# Patient Record
Sex: FEMALE | Race: WHITE | Employment: OTHER
[De-identification: names, ages, dates, MRNs, and addresses within clinical notes are randomized per-mention and may not be internally consistent; named-entity substitution may affect disease eponyms.]

---

## 2017-03-06 PROBLEM — I25.10 CORONARY ARTERY DISEASE INVOLVING NATIVE HEART WITHOUT ANGINA PECTORIS: Status: ACTIVE | Noted: 2017-03-06

## 2017-11-14 ENCOUNTER — HOSPITAL ENCOUNTER (OUTPATIENT)
Dept: GENERAL RADIOLOGY | Facility: CLINIC | Age: 67
Discharge: HOME | End: 2017-11-14
Payer: MEDICARE

## 2017-11-14 DIAGNOSIS — R06.2 WHEEZING: ICD-10-CM

## 2017-11-14 PROCEDURE — 71020 XR CHEST STANDARD TWO VW: CPT

## 2018-05-08 PROBLEM — G47.33 OSA (OBSTRUCTIVE SLEEP APNEA): Status: ACTIVE | Noted: 2018-05-08

## 2019-05-14 PROBLEM — N90.4 LICHEN SCLEROSUS OF FEMALE GENITALIA: Status: ACTIVE | Noted: 2019-05-14

## 2020-04-28 PROBLEM — E11.9 TYPE 2 DIABETES MELLITUS WITHOUT COMPLICATION, WITHOUT LONG-TERM CURRENT USE OF INSULIN (HCC): Status: ACTIVE | Noted: 2020-04-28

## 2020-05-05 RX ORDER — ROSUVASTATIN CALCIUM 5 MG/1
TABLET, COATED ORAL
Qty: 90 TABLET | OUTPATIENT
Start: 2020-05-05

## 2020-07-27 PROBLEM — E89.0 POSTABLATIVE HYPOTHYROIDISM: Status: ACTIVE | Noted: 2020-07-27

## 2020-08-21 ENCOUNTER — HOSPITAL ENCOUNTER (OUTPATIENT)
Age: 70
Setting detail: SPECIMEN
Discharge: HOME | End: 2020-08-21
Payer: MEDICARE

## 2020-08-21 LAB
ALT SERPL-CCNC: 25 U/L (ref 5–33)
ANION GAP SERPL CALCULATED.3IONS-SCNC: 17 MMOL/L (ref 9–17)
AST SERPL-CCNC: 23 U/L
BUN BLDV-MCNC: 10 MG/DL (ref 8–23)
BUN/CREAT BLD: ABNORMAL (ref 9–20)
CALCIUM SERPL-MCNC: 9.8 MG/DL (ref 8.6–10.4)
CHLORIDE BLD-SCNC: 103 MMOL/L (ref 98–107)
CHOLESTEROL, FASTING: 102 MG/DL
CHOLESTEROL/HDL RATIO: 3.4
CO2: 21 MMOL/L (ref 20–31)
CREAT SERPL-MCNC: 0.67 MG/DL (ref 0.5–0.9)
GFR AFRICAN AMERICAN: >60 ML/MIN
GFR NON-AFRICAN AMERICAN: >60 ML/MIN
GFR SERPL CREATININE-BSD FRML MDRD: ABNORMAL ML/MIN/{1.73_M2}
GFR SERPL CREATININE-BSD FRML MDRD: ABNORMAL ML/MIN/{1.73_M2}
GLUCOSE BLD-MCNC: 126 MG/DL (ref 70–99)
HCT VFR BLD CALC: 46.6 % (ref 36.3–47.1)
HDLC SERPL-MCNC: 30 MG/DL
HEMOGLOBIN: 14.6 G/DL (ref 11.9–15.1)
LDL CHOLESTEROL: 25 MG/DL (ref 0–130)
MCH RBC QN AUTO: 27.9 PG (ref 25.2–33.5)
MCHC RBC AUTO-ENTMCNC: 31.3 G/DL (ref 28.4–34.8)
MCV RBC AUTO: 88.9 FL (ref 82.6–102.9)
NRBC AUTOMATED: 0 PER 100 WBC
PDW BLD-RTO: 13 % (ref 11.8–14.4)
PLATELET # BLD: 283 K/UL (ref 138–453)
PMV BLD AUTO: 10.8 FL (ref 8.1–13.5)
POTASSIUM SERPL-SCNC: 4.3 MMOL/L (ref 3.7–5.3)
RBC # BLD: 5.24 M/UL (ref 3.95–5.11)
SODIUM BLD-SCNC: 141 MMOL/L (ref 135–144)
TRIGLYCERIDE, FASTING: 233 MG/DL
VLDLC SERPL CALC-MCNC: ABNORMAL MG/DL (ref 1–30)
WBC # BLD: 7.9 K/UL (ref 3.5–11.3)

## 2020-09-08 ENCOUNTER — HOSPITAL ENCOUNTER (OUTPATIENT)
Dept: CARDIAC CATH/INVASIVE PROCEDURES | Age: 70
Discharge: HOME | End: 2020-09-08
Attending: INTERNAL MEDICINE | Admitting: INTERNAL MEDICINE
Payer: MEDICARE

## 2020-09-08 VITALS
RESPIRATION RATE: 21 BRPM | OXYGEN SATURATION: 86 % | HEART RATE: 65 BPM | DIASTOLIC BLOOD PRESSURE: 106 MMHG | TEMPERATURE: 97 F | WEIGHT: 267 LBS | BODY MASS INDEX: 41.91 KG/M2 | HEIGHT: 67 IN | SYSTOLIC BLOOD PRESSURE: 154 MMHG

## 2020-09-08 PROBLEM — Z95.820 STATUS POST ANGIOPLASTY WITH STENT: Status: ACTIVE | Noted: 2020-09-08

## 2020-09-08 LAB
GFR NON-AFRICAN AMERICAN: >60 ML/MIN
GFR SERPL CREATININE-BSD FRML MDRD: >60 ML/MIN
GFR SERPL CREATININE-BSD FRML MDRD: NORMAL ML/MIN/{1.73_M2}
GLUCOSE BLD-MCNC: 164 MG/DL (ref 74–100)
PLATELET # BLD: 256 K/UL (ref 138–453)
POC CREATININE: 0.71 MG/DL (ref 0.51–1.19)
POC HEMATOCRIT: 45 % (ref 36–46)
POC HEMOGLOBIN: 15.3 G/DL (ref 12–16)
POC IONIZED CALCIUM: 1.22 MMOL/L (ref 1.15–1.33)
POC POTASSIUM: 3.9 MMOL/L (ref 3.5–4.5)
POC SODIUM: 141 MMOL/L (ref 138–146)

## 2020-09-08 PROCEDURE — 82330 ASSAY OF CALCIUM: CPT

## 2020-09-08 PROCEDURE — 82947 ASSAY GLUCOSE BLOOD QUANT: CPT

## 2020-09-08 PROCEDURE — 82565 ASSAY OF CREATININE: CPT

## 2020-09-08 PROCEDURE — C1725 CATH, TRANSLUMIN NON-LASER: HCPCS

## 2020-09-08 PROCEDURE — 2500000003 HC RX 250 WO HCPCS

## 2020-09-08 PROCEDURE — C1760 CLOSURE DEV, VASC: HCPCS

## 2020-09-08 PROCEDURE — C1769 GUIDE WIRE: HCPCS

## 2020-09-08 PROCEDURE — 2709999900 HC NON-CHARGEABLE SUPPLY

## 2020-09-08 PROCEDURE — 85049 AUTOMATED PLATELET COUNT: CPT

## 2020-09-08 PROCEDURE — 84132 ASSAY OF SERUM POTASSIUM: CPT

## 2020-09-08 PROCEDURE — 85014 HEMATOCRIT: CPT

## 2020-09-08 PROCEDURE — C1887 CATHETER, GUIDING: HCPCS

## 2020-09-08 PROCEDURE — 6370000000 HC RX 637 (ALT 250 FOR IP)

## 2020-09-08 PROCEDURE — C9600 PERC DRUG-EL COR STENT SING: HCPCS | Performed by: INTERNAL MEDICINE

## 2020-09-08 PROCEDURE — C1874 STENT, COATED/COV W/DEL SYS: HCPCS

## 2020-09-08 PROCEDURE — 7100000001 HC PACU RECOVERY - ADDTL 15 MIN

## 2020-09-08 PROCEDURE — 84295 ASSAY OF SERUM SODIUM: CPT

## 2020-09-08 PROCEDURE — 6360000004 HC RX CONTRAST MEDICATION

## 2020-09-08 PROCEDURE — C1894 INTRO/SHEATH, NON-LASER: HCPCS

## 2020-09-08 PROCEDURE — 7100000000 HC PACU RECOVERY - FIRST 15 MIN

## 2020-09-08 PROCEDURE — 6360000002 HC RX W HCPCS

## 2020-09-08 PROCEDURE — 93458 L HRT ARTERY/VENTRICLE ANGIO: CPT | Performed by: INTERNAL MEDICINE

## 2020-09-08 RX ORDER — SODIUM CHLORIDE 0.9 % (FLUSH) 0.9 %
10 SYRINGE (ML) INJECTION PRN
Status: DISCONTINUED | OUTPATIENT
Start: 2020-09-08 | End: 2020-09-09 | Stop reason: HOSPADM

## 2020-09-08 RX ORDER — ASPIRIN 81 MG/1
81 TABLET ORAL DAILY
Status: DISCONTINUED | OUTPATIENT
Start: 2020-09-09 | End: 2020-09-09 | Stop reason: HOSPADM

## 2020-09-08 RX ORDER — SODIUM CHLORIDE 0.9 % (FLUSH) 0.9 %
10 SYRINGE (ML) INJECTION EVERY 12 HOURS SCHEDULED
Status: DISCONTINUED | OUTPATIENT
Start: 2020-09-08 | End: 2020-09-09 | Stop reason: HOSPADM

## 2020-09-08 RX ORDER — LEVOTHYROXINE SODIUM 175 UG/1
175 TABLET ORAL DAILY
Status: DISCONTINUED | OUTPATIENT
Start: 2020-09-09 | End: 2020-09-09 | Stop reason: HOSPADM

## 2020-09-08 RX ORDER — LOSARTAN POTASSIUM 50 MG/1
100 TABLET ORAL DAILY
Status: DISCONTINUED | OUTPATIENT
Start: 2020-09-09 | End: 2020-09-09 | Stop reason: HOSPADM

## 2020-09-08 RX ORDER — SODIUM CHLORIDE 0.9 % (FLUSH) 0.9 %
10 SYRINGE (ML) INJECTION PRN
Status: CANCELLED | OUTPATIENT
Start: 2020-09-08

## 2020-09-08 RX ORDER — CLOPIDOGREL BISULFATE 75 MG/1
75 TABLET ORAL DAILY
Status: DISCONTINUED | OUTPATIENT
Start: 2020-09-08 | End: 2020-09-09 | Stop reason: HOSPADM

## 2020-09-08 RX ORDER — ROSUVASTATIN CALCIUM 10 MG/1
10 TABLET, COATED ORAL NIGHTLY
Status: DISCONTINUED | OUTPATIENT
Start: 2020-09-08 | End: 2020-09-09 | Stop reason: HOSPADM

## 2020-09-08 RX ORDER — ONDANSETRON 2 MG/ML
4 INJECTION INTRAMUSCULAR; INTRAVENOUS EVERY 6 HOURS PRN
Status: DISCONTINUED | OUTPATIENT
Start: 2020-09-08 | End: 2020-09-09 | Stop reason: HOSPADM

## 2020-09-08 RX ORDER — SODIUM CHLORIDE 0.9 % (FLUSH) 0.9 %
10 SYRINGE (ML) INJECTION EVERY 12 HOURS SCHEDULED
Status: CANCELLED | OUTPATIENT
Start: 2020-09-08

## 2020-09-08 RX ORDER — LABETALOL HYDROCHLORIDE 5 MG/ML
10 INJECTION, SOLUTION INTRAVENOUS EVERY 30 MIN PRN
Status: DISCONTINUED | OUTPATIENT
Start: 2020-09-08 | End: 2020-09-09 | Stop reason: HOSPADM

## 2020-09-08 RX ORDER — SODIUM CHLORIDE 9 MG/ML
INJECTION, SOLUTION INTRAVENOUS CONTINUOUS
Status: DISCONTINUED | OUTPATIENT
Start: 2020-09-08 | End: 2020-09-09 | Stop reason: HOSPADM

## 2020-09-08 RX ORDER — ACETAMINOPHEN 325 MG/1
650 TABLET ORAL EVERY 4 HOURS PRN
Status: DISCONTINUED | OUTPATIENT
Start: 2020-09-08 | End: 2020-09-09 | Stop reason: HOSPADM

## 2020-09-08 RX ORDER — ACETAMINOPHEN 325 MG/1
650 TABLET ORAL EVERY 4 HOURS PRN
Status: CANCELLED | OUTPATIENT
Start: 2020-09-08

## 2020-09-08 RX ADMIN — SODIUM CHLORIDE: 9 INJECTION, SOLUTION INTRAVENOUS at 11:40

## 2020-09-08 NOTE — PROGRESS NOTES
Returned to room 2 on [facility] awake alert no c/o of pain offered, rt. Groin bandaid in tact clean et dry, are soft to touch no bleeding noted, taking fluids well, vitals stable.

## 2020-09-08 NOTE — PROGRESS NOTES
Received post procedure to [facility] to room 2. Assessment obtained. Restrictions reviewed with patient. Post procedure pathway initiated. Right femoral site soft , band aid dry and intact. No hematoma noted. Family at side. Patient without complaints. Head of bed flat with right leg straight.

## 2020-09-08 NOTE — H&P
[facility]  Procedure History and Physical Update          Patient Name: Donovan Randolph  MRN:    [id number]  YOB: 1950  Date of evaluation:  2020    Procedure:    Cardiac cath +/- PCI    Indication for procedure:  Angina      Please refer to the office note completed by Dr. Dora Grey on 2020 in the medical record and note that:    [x] I have examined the patient and reviewed the H&P/Consult and there are no changes to be made to the assessment or plan. [] I have examined the patient and reviewed the H&P/Consult and have noted the following changes:    Past Medical History:   Diagnosis Date    Allergic rhinitis     Anxiety 2015    Arthritis 10/22/2014    Class 3 obesity due to excess calories with serious comorbidity and body mass index (BMI) of 40.0 to 44.9 in adult 2018    Coronary artery disease involving native heart without angina pectoris 3/6/2017    Grave's disease     Hypertension     IFG (impaired fasting glucose)     Lichen sclerosus of female genitalia 2019    DANICA (obstructive sleep apnea) 2018    Osteoarthritis     S/P cardiac cath 2020       Past Surgical History:   Procedure Laterality Date     SECTION  1981    repair 11 month later     CHOLECYSTECTOMY         Family History   Problem Relation Age of Onset    Cancer Mother         colon, lung, brain    High Blood Pressure Father     Other Father         claudication of legs       Allergies   Allergen Reactions    Amoxicillin-Pot Clavulanate Diarrhea    Cymbalta [Duloxetine Hcl] Diarrhea    Tape [Adhesive Tape] Rash       Prior to Admission medications    Medication Sig Start Date End Date Taking?  Authorizing Provider   pantoprazole (PROTONIX) 40 MG tablet Take 1 tablet by mouth every morning (before breakfast) 20  Yes Merribeth Sleight, DO   losartan (COZAAR) 100 MG tablet Take 1 tablet by mouth daily 20  Yes Merribeth Sleight, DO   fluticasone [facility]) 50 MCG/ACT nasal spray 1 spray by Nasal route daily 9/3/19  Yes Vahid Hernandez DO   clobetasol (TEMOVATE) 0.05 % cream Apply topically 3/18/19  Yes Historical Provider, MD   rosuvastatin (CRESTOR) 5 MG tablet Every other day 10/16/17  Yes Historical Provider, MD   clopidogrel (PLAVIX) 75 MG tablet TK 1 T PO QD 1/20/17  Yes Historical Provider, MD   aspirin EC 81 MG EC tablet Take 1 tablet by mouth daily 12/6/16  Yes Vahid Hernandez DO   vitamin D (ERGOCALCIFEROL) 95312 UNITS CAPS capsule Take 1 capsule by mouth once a week 7/12/16  Yes Vahid Hernandez DO   levothyroxine (SYNTHROID) 175 MCG tablet Take 175 mcg by mouth Daily. Yes Historical Provider, MD   tobramycin (TOBREX) 0.3 % ophthalmic solution INT 1 GTT IN OS TID FOR 7 DAYS 12/7/19   Historical Provider, MD   Omega-3 Fatty Acids (FISH OIL PO) Take by mouth    Historical Provider, MD   MAGNESIUM PO Take by mouth    Historical Provider, MD   metFORMIN (GLUCOPHAGE-XR) 500 MG extended release tablet Take 1 tablet by mouth 2 times daily 11/14/17   Vahid Hernandez DO   nitroGLYCERIN (NITROSTAT) 0.4 MG SL tablet Place 1 tablet under the tongue every 5 minutes as needed for Chest pain Dissolve 1 tablet under tongue for chest pain and repeat every 5 min up to max of 3 total doses. If no relief after 3 doses call 911 12/6/16   Vahid Hernandez DO         Vitals:    09/08/20 1129   BP: 128/60   Pulse: 78   Resp: 16   Temp: 97 °F (36.1 °C)   SpO2: 97%       Constitutional and General Appearance:   alert, cooperative, no distress and appears stated age  HEENT:  · PERRL, EOMI  Respiratory:  · Normal excursion and expansion without use of accessory muscles  · Resp Auscultation:  Good respiratory effort. No for increased work of breathing. On auscultation: clear to auscultation bilaterally  Cardiovascular:  · Regular rate and rhythm. · S1/S2  · No murmurs.   · The apical impulse is not displaced  Abdomen:  · Soft  · Bowel sounds present  · Non-tender to palpation  Extremities:  · No cyanosis or clubbing  · Lower extremity edema: No.  Skin:  · Warm and dry  Neurological:  · Alert and oriented. · Moves all extremities well      Plan:  · Proceed with planned procedure. · Further orders to follow. Risks, benefits, and alternatives of cardiac catheterization were discussed, in detail, with patient. Risks include, but not limited to, bleeding, requiring blood transfusion, vascular complication requiring surgery, renal failure with need of dialysis, CVA, MI, death and anesthesia complications including intubation were discussed. Patient verbalized understanding and agreed to proceed with the procedure understanding the above risks and alternatives to the procedure.     Electronically signed on 09/08/20 at 12:19 PM by:    Chidi Bradley MD   Fellow, [address]

## 2020-09-08 NOTE — OP NOTE
[facility]    CARDIAC CATHETERIZATION    Date:   9/8/2020  Patient name:  Amador Fulton  Date of admission:  9/8/2020 10:05 AM  MRN:   [id number]  YOB: 1950    Operators:  Primary:   ARGENTINA Fleming MD (Attending Physician)    Assistant/CV fellow: Chris Torres MD      Procedure performed:     [x] Left Heart Catheterization. [] Graft Angiography. [x] Left Ventriculography. [] Right Heart Catheterization. [x] Coronary Angiography. [] Aortic Valve Studies. [] PCI:      [] Other:       Pre Procedure Conscious Sedation Data:  ASA Class:    [] I [x] II [] III [] IV    Mallampati Class:  [] I [x] II [] III [] IV      Indication:  [] STEMI      [] + Stress test  [] ACS      [] + EKG Changes  [] Non Q MI       [] Significant Risk Factors  [x] Recurrent Angina             [] Diabetes Mellitus    [] New LBBB      [] Other.  [] CHF / Low EF changes     [] Abnormal CTA / Ca Score      Procedure:  Access:  [x] Femoral  [] Radial  artery       [x] Right  [] Left    Procedure: After informed consent was obtained with explanation of the risks and benefits, patient was brought to the cath lab. The access area was prepped and draped in sterile fashion. 1% lidocaine was used for local block. The artery was cannulated with 6  Fr sheath with brisk arterial blood return. The side port was frequently flushed and aspirated with normal saline. Estimated Blood Loss:  [x] Minimal < 25 cc [] Moderate 25-50 cc  [] >50 cc    Findings:    LMCA: Normal 0% stenosis. LAD: has proximal 30% eccentric stenosis with mid patent stent. The D1 is   small and is normal.     LCx: is normal.     RCA: has patent previously placed stent in the proximal and mid segment. Ramus: has proximal hazy lesion with focal 85% stenosis.       Lesion on Ramus: 85% stenosis 18 mm length reduced to 0%. Pre procedure     SONU III flow was noted. Post Procedure SONU III flow was present. Good     runoff was present.

## 2020-09-09 ENCOUNTER — CARE COORDINATION (OUTPATIENT)
Dept: CASE MANAGEMENT | Age: 70
End: 2020-09-09

## 2020-09-09 NOTE — CARE COORDINATION
Challenges to be reviewed by provider: None  Additional needs identified to be addressed with provider No  none  Discussed COVID-19 related testing which was not done at this time. Test results were not done. Patient informed of results, if available? NA         Method of communication with provider : none    [facility] Transitions Initial Follow Up Call    Call within 2 business days of discharge: Yes    Patient: Nena Deleon Patient : 1950   MRN: [id number]  Reason for Admission: Left Heart Cath  Discharge Date: 16 RARS: No data recorded    Last Discharge [facility]       Complaint Diagnosis Description Type Department Provider    16   Admission (Discharged) STV CATH LAB Lovett Collet, MD           Spoke with: Alice Matiasjoanne, patient    Facility: [facility]    Follow Up:  Contacted patient for BPCI-A Outpatient Cath Lab follow up. Introduced self and provided explanation BPCI-A follow up calls. She is in agreement to receive calls. Alta Rodriguez stated she is doing great. She reports site looks good. No bleeding, drainage, bruising, hematoma, swelling, pain, numbness, fever or chills. No c/o of chest pain/discomfort, palpitations, shortness of breath. Reviewed AVS and discharge instructions with patient. Reviewed medication list.  Patient told to restart Metformin after 48 hours from procedure. She is not taking her supplements except for Vit D. Crestor was changed by her MD to everyday instead of every other day. Discussed signs/symptoms and when to contact MD with any new or worsening symptoms and when to call 911 or report to the ER. Holmjace Rodriguez verbalized understanding. Patient does not have a follow up appointment scheduled. CTN offered to schedule appointment but she declined. She stated she will call today. She is aware of the importance of follow up appointment. She stated this is not her first time having an angioplasty.   She does not have any questions or concerns at this time. Will continue to follow. Future Appointments   Date Time Provider Stephie Saray   10/20/2020  2:30 PM Chalice Orthodox, DO [facility] None       Did you receive your AVS? Yes  Do you have your follow up visit scheduled? No: CTN offered to schedule appointment but patient declined and stated she will call today. Do you have: none    Did you receive your oral antiplatelet? Yes     Exercise - You may resume walking at home and may walk the distance you are used to. If you have not been walking, you may start at a low level. A home walking program may be enclosed for you to follow as a guide. Follow these steps unless advised not to by your doctor. Activity -Your activity will also depend upon where the catheter was inserted: Do not sit for long periods of time. Try to change positions frequently    Lifting - Do not lift over 10 pounds for the 5-7 days. Stairs - As tolerated. Work - Depends on the type of work. Check with your doctor. Driving - May resume 2 to 3 days after discharge unless advised differently. Household Chores - May resume light housework such as dishes or laundry. Wait 2 to 3 days before using the vacuum . Sexual Activity - Avoid for 5-7 days after discharge. Puncture Site - May have soreness at the site and area may become bruised. Diet - Follow a low-fat diet. Smoking - This is a major risk factor for heart disease. If you are a smoker, we urge you to quit. If you find you need help in quitting, talk with your doctor or the cardiac rehab center. Antibiotic Therapy - During the first 8 weeks after your stent placement, you may need antibiotics before any dental work, certain tests, or operations. Let all of your doctors and dentist know you have a stent. Please notify your doctor if:  1. Chest pain returns. 2. Puncture site extremity is white, numb, or very painful.   3. There is bleeding at the puncture site.      Future Appointments   Date Time Provider Stephie So   10/20/2020  2:30 PM Ruth Dorado, DO [facility] None       Martinadiya Caruso RN

## 2020-09-16 ENCOUNTER — CARE COORDINATION (OUTPATIENT)
Dept: CASE MANAGEMENT | Age: 70
End: 2020-09-16

## 2020-09-23 ENCOUNTER — CARE COORDINATION (OUTPATIENT)
Dept: CASE MANAGEMENT | Age: 70
End: 2020-09-23

## 2020-10-02 ENCOUNTER — CARE COORDINATION (OUTPATIENT)
Dept: CASE MANAGEMENT | Age: 70
End: 2020-10-02

## 2020-10-02 NOTE — CARE COORDINATION
Challenges to be reviewed by the provider: Awaiting clearance for scope from Dr Michael Dunham office  Additional needs identified to be addressed with provider Yes  none  Discussed COVID-19 related testing which was not done at this time. Test results were not done. Patient informed of results, if available? N/A         Method of communication with provider : phone    Do you have: none  Puncture site status: wnl  Pain status: none  Activity status: wnl    [facility] Transitions Follow Up Call    10/2/2020    Patient: Vickie Angel  Patient : 1950   MRN: [id number]  Reason for Admission: Cardiac cath  Discharge Date: 20RARS: No data recorded       Spoke Kar Crawley stated she is on her way to pharmacy to  new medications Medrol Oscar and Singulair. Pt had VV yesterday and is having seasonal allergies. Stated she received allergy shot last September, is trying prednisone taper this time. Stated she has side effects of jitteriness and hunger with steroids, may decide not to take it. Stated she is awaiting clearance from Dr Pau Nieves office to have scope from Dr Saul Lewis but has not received any word on status yet. No issues r/t cardiac cath but has noticed her BP has been a little more elevated since cath, running around 144/82 and is not sure if r/t cath or stress from allergies. Stated she will continue to monitor and call office with concerns. CTN called Dr Pau Nieves office, no request found from Dr Blaze Roblero office. CTN called Dr Blaze Roblero office and left VM to [address] [place] [address] requesting call back. Left number to send request to Dr Pau Nieves office. Care Transitions Subsequent and Final Call    Subsequent and Final Calls  Care Transitions Interventions  Other Interventions:             Follow Up  Future Appointments   Date Time Provider Stephie So   10/20/2020  2:30 PM Vale De Santiago DO [facility] None       Cheryle Flores, RA

## 2020-10-12 ENCOUNTER — CARE COORDINATION (OUTPATIENT)
Dept: CASE MANAGEMENT | Age: 70
End: 2020-10-12

## 2020-10-12 NOTE — CARE COORDINATION
provider with any new or worsening symptoms as well as questions or concerns that she may have. She verbalized understanding. CTN contacted [facility]. Spoke with Benton Sepulveda. Explained reason for call. Benton Sepulveda stated they have not received any request for clearance for patient. She stated they will need something in writing for the clearance. Fax number is [phone]. CTN contacted Dr. Jose Dela Cruz's office. Left message for [facility]. Left message with reason for call, contact information and request for call back. Called patient back. Informed her that cardiology has not received request from Dr. Hardik Lee office. Informed her that CTN left message for Dr. Maryan Koyanagi regarding cardiology needing request in writing and left contact information and request for call back. She verbalized understanding. Patient stated she will call their office if she does not hear anything by next Monday, 10/19/20.         Future Appointments   Date Time Provider Stephie So   10/20/2020  2:30 PM DO BRANDON Starks [facility] None       Gifty Navarrete RN

## 2020-10-19 ENCOUNTER — CARE COORDINATION (OUTPATIENT)
Dept: CASE MANAGEMENT | Age: 70
End: 2020-10-19

## 2020-10-20 ENCOUNTER — CARE COORDINATION (OUTPATIENT)
Dept: CASE MANAGEMENT | Age: 70
End: 2020-10-20

## 2020-10-20 NOTE — CARE COORDINATION
[facility] Transitions Follow Up Call    10/20/2020    Patient: James Mcqueen  Patient : 1950   MRN: [id number]  Reason for Admission:   Discharge Date: 16 RARS: No data recorded     Follow Up:  Received return phone call from patient. She left message stating she was returning phone call from yesterday. She can be reached at [phone]. CTN attempted to call patient back. Unable to reach patient. CTN did not leave a message. Will try again at a later time.       Future Appointments   Date Time Provider Stephie So   2021  1:30 PM DO BRANDON Starks [facility] None       Gifty Navarrete RN

## 2020-10-21 ENCOUNTER — CARE COORDINATION (OUTPATIENT)
Dept: CASE MANAGEMENT | Age: 70
End: 2020-10-21

## 2020-10-21 NOTE — CARE COORDINATION
Challenges to be reviewed by the provider: None  Additional needs identified to be addressed with provider No  none  Discussed COVID-19 related testing which was not done at this time. Test results were not done. Patient informed of results, if available? NA         Method of communication with provider : none    Do you have: none  Puncture site status: WNL  Pain status: None  Activity status: Back to normal activities    [facility] [facility] Transitions Follow Up Call    10/21/2020    Patient: Claudine Morgan  Patient : 1950   MRN: [id number]  Reason for Admission:   Discharge Date: 16 RARS: No data recorded       Spoke with: Ced Islas, patient    Follow Up:  Contacted patient for [facility] BPCI-A follow up. Sarah Yu stated she is doing fine. Reports she saw her PCP yesterday and was prescribed medication to help with her BP. She denies having any chest pain, increased shortness of breath. She stated she may get woozy and fluttering in her chest at times especially when initially getting out of bed. She stated she will continue to monitor symptoms. If they continue she was advised to contact cardiologist.  She verbalized understanding. Puncture site WNL. Denies having any pain. She has all of her medications and taking them as prescribed. No questions or concerns at this time. Will continue to follow.       Future Appointments   Date Time Provider Stephie So   2021  1:30 PM DO BRANDON Parikh [facility] None       Brody Galvez RN

## 2020-10-30 ENCOUNTER — CARE COORDINATION (OUTPATIENT)
Dept: CASE MANAGEMENT | Age: 70
End: 2020-10-30

## 2020-10-30 NOTE — CARE COORDINATION
Challenges to be reviewed by the provider: None  Additional needs identified to be addressed with provider No  none  Discussed COVID-19 related testing which was not done at this time. Test results were not done. Patient informed of results, if available? NA         Method of communication with provider : none    Do you have: none  Puncture site status: WNL  Pain status: none  Activity status: Back to normal activities    [address] Follow Up Call    10/30/2020    Patient: Aaron Bowser  Patient : 1950   MRN: [id number]  Reason for Admission:   Discharge Date: 16 RARS: No data recorded       Spoke with: Franklin Bianchi, patient    Follow Up:  Contacted patient for [facility] BPCI-A follow up. Melissa Jackson stated she is doing fine. 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 She stated she has not been checking her BP. Discussed importance on monitoring her BP. She continues to take all of her medications. She denies having any pain or shortness of breath. She reports her allergies have been an issue. 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 She has noticed that when she is wearing a mask during the process, her allergies are not as bad. 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 She is aware of when to contact MD with any new or worsening symptoms. No questions or concerns at this time. Will continue to follow.       Future Appointments   Date Time Provider Stephie So   2021  1:30 PM Sunny Olson DO [facility] None       Roel Chavez RN

## 2020-11-06 ENCOUNTER — CARE COORDINATION (OUTPATIENT)
Dept: CASE MANAGEMENT | Age: 70
End: 2020-11-06

## 2020-11-06 NOTE — CARE COORDINATION
Challenges to be reviewed by the provider: None  Additional needs identified to be addressed with provider No  none  Discussed COVID-19 related testing which was not done at this time. Test results were not done. Patient informed of results, if available? NA         Method of communication with provider : none    Do you have: none  Puncture site status: WNL  Pain status: none  Activity status: [address] Transitions Follow Up Call    2020    Patient: Ashley Shukla  Patient : 1950   MRN: [id number]  Reason for Admission:   Discharge Date: 16 RARS: No data recorded       Spoke with: Eduardo Newell, patient    Care Transitions Subsequent and Final Call    Subsequent and Final Calls  Have your medications changed?:  No  Do you have any questions related to your medications?:  No  Do you currently have any active services?:  No  Do you have any needs or concerns that I can assist you with?:  No  Identified Barriers:  None  Care Transitions Interventions  Other Interventions: Follow Up:  Contacted patient for [facility] BPCI-A follow up. Ricarda Crocker stated she is doing better. Reports she put her soaps and creams away and she is no longer having any allergy symptoms. She denies having any chest pain/discomfort or shortness of breath. She reports having dizziness initially when getting out of bed. Advised she sit at the edge of the bed for a few minutes and avoid sudden or quick movements. She verbalized understanding. She stated she has been sitting at the edge of the bed before standing which has helped. She also reports that she has noticed a \"slight\" bounding sensation with any activity. She then stated it's not a bounding but could not describe the feeling. She denies having any pain/discomfort but stated it's just noticeable. Advised she continue to monitor and contact MD if sensation occurs more frequently or worsens. She verbalized understanding.   Patient has not been checking BP. Discussed importance on monitoring BP. She checked her BP while on the phone with CTN. BP was 126/69. She stated she will continue to check it. She stated she is aware of when to contact MD with any new or worsening symptoms. No questions or concerns at this time. Will continue to follow.       Future Appointments   Date Time Provider Stephie So   1/20/2021  1:30 PM DO BRANDON Jimenez [facility] None       Orlando Patiño, RA

## 2020-11-10 ENCOUNTER — CARE COORDINATION (OUTPATIENT)
Dept: CASE MANAGEMENT | Age: 70
End: 2020-11-10

## 2020-11-17 ENCOUNTER — CARE COORDINATION (OUTPATIENT)
Dept: CASE MANAGEMENT | Age: 70
End: 2020-11-17

## 2020-11-17 NOTE — CARE COORDINATION
[facility] Transitions Follow Up Call    2020    Patient: Gilma Remy  Patient : 1950   MRN: [id number]  Reason for Admission:   Discharge Date: 16 RARS: No data recorded       Spoke with: Solmon Najjar, patient    Care Transitions Subsequent and Final Call    Subsequent and Final Calls  Have your medications changed?:  No  Do you have any questions related to your medications?:  No  Do you currently have any active services?:  No  Do you have any needs or concerns that I can assist you with?:  No  Identified Barriers:  None  Care Transitions Interventions  Other Interventions: Follow Up:  Contacted patient for [facility] BPCI-A follow up. Olinda stated she is doing good except for her allergies. She had been doing good up until this morning when she woke up sneezing. 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 She reports the dizziness when initially getting out of bed has improved. Denies having any chest pain/discomfort, shortness of breath. She has been checking her BP when she remembers. Reports BP has been good. CTN advised patient to monitor allergies and if they worsen to contact provider. She verbalized understanding. She has all of her medications and taking them as prescribed. No questions or concerns at this time. Will continue to follow.       Future Appointments   Date Time Provider Stephie So   2021  1:30 PM Shabnam Monsalve DO [facility] None       Liu Barillas RN

## 2020-12-03 ENCOUNTER — HOSPITAL ENCOUNTER (OUTPATIENT)
Age: 70
Setting detail: SPECIMEN
Discharge: HOME | End: 2020-12-03
Payer: MEDICARE

## 2020-12-04 ENCOUNTER — CARE COORDINATION (OUTPATIENT)
Dept: CASE MANAGEMENT | Age: 70
End: 2020-12-04

## 2020-12-04 LAB
ANION GAP SERPL CALCULATED.3IONS-SCNC: 22 MMOL/L (ref 9–17)
BUN BLDV-MCNC: 13 MG/DL (ref 8–23)
BUN/CREAT BLD: ABNORMAL (ref 9–20)
CALCIUM SERPL-MCNC: 10.3 MG/DL (ref 8.6–10.4)
CHLORIDE BLD-SCNC: 100 MMOL/L (ref 98–107)
CO2: 22 MMOL/L (ref 20–31)
CREAT SERPL-MCNC: 0.69 MG/DL (ref 0.5–0.9)
GFR AFRICAN AMERICAN: >60 ML/MIN
GFR NON-AFRICAN AMERICAN: >60 ML/MIN
GFR SERPL CREATININE-BSD FRML MDRD: ABNORMAL ML/MIN/{1.73_M2}
GFR SERPL CREATININE-BSD FRML MDRD: ABNORMAL ML/MIN/{1.73_M2}
GLUCOSE BLD-MCNC: 154 MG/DL (ref 70–99)
POTASSIUM SERPL-SCNC: 4.1 MMOL/L (ref 3.7–5.3)
SODIUM BLD-SCNC: 144 MMOL/L (ref 135–144)
TSH SERPL DL<=0.05 MIU/L-ACNC: 1.18 MIU/L (ref 0.3–5)

## 2020-12-04 NOTE — CARE COORDINATION
[facility] Transitions Follow Up Call    2020    Patient: Lexi Veras  Patient : 1950   MRN: [id number]  Reason for Admission:   Discharge Date: 16 RARS: No data recorded    Follow Up: Attempted to contact patient for final [facility] BPCI-A follow up. Unable to reach patient. Left message with reason for call, contact information and request for call back if having any questions or concerns. BPCI-A bundle ending. CTN signing off.       Gabriel Henry RN

## 2021-01-20 PROBLEM — K44.9 GASTROESOPHAGEAL REFLUX DISEASE WITH HIATAL HERNIA: Status: ACTIVE | Noted: 2021-01-20

## 2021-01-20 PROBLEM — K21.9 GASTROESOPHAGEAL REFLUX DISEASE WITH HIATAL HERNIA: Status: ACTIVE | Noted: 2021-01-20

## 2021-04-28 ENCOUNTER — HOSPITAL ENCOUNTER (OUTPATIENT)
Age: 71
Setting detail: SPECIMEN
Discharge: HOME | End: 2021-04-28
Payer: MEDICARE

## 2021-04-28 DIAGNOSIS — L98.9 SKIN LESION: ICD-10-CM

## 2021-04-29 LAB
CULTURE: NORMAL
Lab: NORMAL
SPECIMEN DESCRIPTION: NORMAL

## 2021-04-30 LAB
CULTURE: ABNORMAL
DIRECT EXAM: ABNORMAL
DIRECT EXAM: ABNORMAL
Lab: ABNORMAL
SPECIMEN DESCRIPTION: ABNORMAL

## 2021-07-14 ENCOUNTER — HOSPITAL ENCOUNTER (OUTPATIENT)
Age: 71
Setting detail: SPECIMEN
Discharge: HOME | End: 2021-07-14
Payer: MEDICARE

## 2021-07-14 DIAGNOSIS — E89.0 POSTABLATIVE HYPOTHYROIDISM: ICD-10-CM

## 2021-07-14 DIAGNOSIS — E11.9 TYPE 2 DIABETES MELLITUS WITHOUT COMPLICATION, WITHOUT LONG-TERM CURRENT USE OF INSULIN (HCC): ICD-10-CM

## 2021-07-14 DIAGNOSIS — I10 ESSENTIAL HYPERTENSION: ICD-10-CM

## 2021-07-14 LAB
ANION GAP SERPL CALCULATED.3IONS-SCNC: 17 MMOL/L (ref 9–17)
BUN BLDV-MCNC: 11 MG/DL (ref 8–23)
BUN/CREAT BLD: ABNORMAL (ref 9–20)
CALCIUM SERPL-MCNC: 9.5 MG/DL (ref 8.6–10.4)
CHLORIDE BLD-SCNC: 102 MMOL/L (ref 98–107)
CHOLESTEROL, FASTING: 115 MG/DL
CHOLESTEROL/HDL RATIO: 3.6
CO2: 22 MMOL/L (ref 20–31)
CREAT SERPL-MCNC: 0.55 MG/DL (ref 0.5–0.9)
ESTIMATED AVERAGE GLUCOSE: 134 MG/DL
GFR AFRICAN AMERICAN: >60 ML/MIN
GFR NON-AFRICAN AMERICAN: >60 ML/MIN
GFR SERPL CREATININE-BSD FRML MDRD: ABNORMAL ML/MIN/{1.73_M2}
GFR SERPL CREATININE-BSD FRML MDRD: ABNORMAL ML/MIN/{1.73_M2}
GLUCOSE FASTING: 150 MG/DL (ref 70–99)
HBA1C MFR BLD: 6.3 % (ref 4–6)
HDLC SERPL-MCNC: 32 MG/DL
LDL CHOLESTEROL: 28 MG/DL (ref 0–130)
POTASSIUM SERPL-SCNC: 4 MMOL/L (ref 3.7–5.3)
SODIUM BLD-SCNC: 141 MMOL/L (ref 135–144)
THYROXINE, FREE: 2.09 NG/DL (ref 0.93–1.7)
TRIGLYCERIDE, FASTING: 275 MG/DL
TSH SERPL DL<=0.05 MIU/L-ACNC: 0.03 MIU/L (ref 0.3–5)
VLDLC SERPL CALC-MCNC: ABNORMAL MG/DL (ref 1–30)

## 2021-09-30 ENCOUNTER — HOSPITAL ENCOUNTER (OUTPATIENT)
Age: 71
Setting detail: SPECIMEN
Discharge: HOME | End: 2021-09-30
Payer: MEDICARE

## 2021-09-30 DIAGNOSIS — E89.0 POSTABLATIVE HYPOTHYROIDISM: ICD-10-CM

## 2021-09-30 LAB
THYROXINE, FREE: 1.73 NG/DL (ref 0.93–1.7)
TSH SERPL DL<=0.05 MIU/L-ACNC: 0.41 MIU/L (ref 0.3–5)

## 2022-01-19 ENCOUNTER — HOSPITAL ENCOUNTER (OUTPATIENT)
Age: 72
Setting detail: SPECIMEN
Discharge: HOME | End: 2022-01-19

## 2022-01-19 DIAGNOSIS — E89.0 POSTABLATIVE HYPOTHYROIDISM: ICD-10-CM

## 2022-01-19 DIAGNOSIS — E11.9 TYPE 2 DIABETES MELLITUS WITHOUT COMPLICATION, WITHOUT LONG-TERM CURRENT USE OF INSULIN (HCC): ICD-10-CM

## 2022-01-20 LAB
ESTIMATED AVERAGE GLUCOSE: 134 MG/DL
HBA1C MFR BLD: 6.3 % (ref 4–6)
THYROXINE, FREE: 1.51 NG/DL (ref 0.93–1.7)
TSH SERPL DL<=0.05 MIU/L-ACNC: 11.89 MIU/L (ref 0.3–5)

## 2022-03-24 ENCOUNTER — HOSPITAL ENCOUNTER (OUTPATIENT)
Age: 72
Setting detail: SPECIMEN
Discharge: HOME | End: 2022-03-24

## 2022-03-24 DIAGNOSIS — R53.83 FATIGUE, UNSPECIFIED TYPE: ICD-10-CM

## 2022-03-24 LAB
ABSOLUTE EOS #: 0.14 K/UL (ref 0–0.44)
ABSOLUTE IMMATURE GRANULOCYTE: 0.04 K/UL (ref 0–0.3)
ABSOLUTE LYMPH #: 2.36 K/UL (ref 1.1–3.7)
ABSOLUTE MONO #: 0.62 K/UL (ref 0.1–1.2)
ALBUMIN SERPL-MCNC: 4.6 G/DL (ref 3.5–5.2)
ALBUMIN/GLOBULIN RATIO: 1.8 (ref 1–2.5)
ALP BLD-CCNC: 82 U/L (ref 35–104)
ALT SERPL-CCNC: 18 U/L (ref 5–33)
ANION GAP SERPL CALCULATED.3IONS-SCNC: 11 MMOL/L (ref 9–17)
AST SERPL-CCNC: 15 U/L
BASOPHILS # BLD: 1 % (ref 0–2)
BASOPHILS ABSOLUTE: 0.06 K/UL (ref 0–0.2)
BILIRUB SERPL-MCNC: 0.82 MG/DL (ref 0.3–1.2)
BUN BLDV-MCNC: 18 MG/DL (ref 8–23)
CALCIUM SERPL-MCNC: 9.8 MG/DL (ref 8.6–10.4)
CHLORIDE BLD-SCNC: 100 MMOL/L (ref 98–107)
CO2: 29 MMOL/L (ref 20–31)
CREAT SERPL-MCNC: 0.72 MG/DL (ref 0.5–0.9)
EOSINOPHILS RELATIVE PERCENT: 2 % (ref 1–4)
GFR AFRICAN AMERICAN: >60 ML/MIN
GFR NON-AFRICAN AMERICAN: >60 ML/MIN
GFR SERPL CREATININE-BSD FRML MDRD: ABNORMAL ML/MIN/{1.73_M2}
GLUCOSE FASTING: 156 MG/DL (ref 70–99)
HCT VFR BLD CALC: 43.9 % (ref 36.3–47.1)
HEMOGLOBIN: 14.1 G/DL (ref 11.9–15.1)
IMMATURE GRANULOCYTES: 1 %
LYMPHOCYTES # BLD: 32 % (ref 24–43)
MCH RBC QN AUTO: 29.4 PG (ref 25.2–33.5)
MCHC RBC AUTO-ENTMCNC: 32.1 G/DL (ref 28.4–34.8)
MCV RBC AUTO: 91.5 FL (ref 82.6–102.9)
MONOCYTES # BLD: 9 % (ref 3–12)
NRBC AUTOMATED: 0 PER 100 WBC
PDW BLD-RTO: 13 % (ref 11.8–14.4)
PLATELET # BLD: 260 K/UL (ref 138–453)
PMV BLD AUTO: 10.6 FL (ref 8.1–13.5)
POTASSIUM SERPL-SCNC: 4.3 MMOL/L (ref 3.7–5.3)
RBC # BLD: 4.8 M/UL (ref 3.95–5.11)
SEG NEUTROPHILS: 55 % (ref 36–65)
SEGMENTED NEUTROPHILS ABSOLUTE COUNT: 4.07 K/UL (ref 1.5–8.1)
SODIUM BLD-SCNC: 140 MMOL/L (ref 135–144)
TOTAL PROTEIN: 7.1 G/DL (ref 6.4–8.3)
WBC # BLD: 7.3 K/UL (ref 3.5–11.3)

## 2022-03-29 ENCOUNTER — HOSPITAL ENCOUNTER (OUTPATIENT)
Age: 72
Setting detail: SPECIMEN
Discharge: HOME | End: 2022-03-29

## 2022-03-29 DIAGNOSIS — E89.0 POSTABLATIVE HYPOTHYROIDISM: ICD-10-CM

## 2022-03-29 LAB
THYROXINE, FREE: 1.68 NG/DL (ref 0.93–1.7)
TSH SERPL DL<=0.05 MIU/L-ACNC: 3.27 UIU/ML (ref 0.3–5)

## 2022-04-19 PROBLEM — H25.13 NUCLEAR SCLEROTIC CATARACT OF BOTH EYES: Status: ACTIVE | Noted: 2018-01-09

## 2023-01-04 ENCOUNTER — HOSPITAL ENCOUNTER (OUTPATIENT)
Age: 73
Setting detail: SPECIMEN
Discharge: HOME | End: 2023-01-04

## 2023-01-04 LAB
ALT SERPL-CCNC: 23 U/L (ref 5–33)
AST SERPL-CCNC: 19 U/L
CHOLESTEROL, FASTING: 121 MG/DL
CHOLESTEROL/HDL RATIO: 3.1
HDLC SERPL-MCNC: 39 MG/DL
LDL CHOLESTEROL: 43 MG/DL (ref 0–130)
TRIGLYCERIDE, FASTING: 193 MG/DL

## 2023-01-26 ENCOUNTER — HOSPITAL ENCOUNTER (OUTPATIENT)
Age: 73
Setting detail: SPECIMEN
Discharge: HOME | End: 2023-01-26

## 2023-01-26 DIAGNOSIS — E89.0 POSTABLATIVE HYPOTHYROIDISM: ICD-10-CM

## 2023-01-26 DIAGNOSIS — E11.9 TYPE 2 DIABETES MELLITUS WITHOUT COMPLICATION, WITHOUT LONG-TERM CURRENT USE OF INSULIN (HCC): ICD-10-CM

## 2023-01-26 DIAGNOSIS — R00.2 POUNDING HEARTBEAT: ICD-10-CM

## 2023-01-26 PROBLEM — E11.22 TYPE 2 DIABETES MELLITUS WITH CHRONIC KIDNEY DISEASE (HCC): Status: ACTIVE | Noted: 2023-01-26

## 2023-01-26 LAB
ABSOLUTE EOS #: 0.1 K/UL (ref 0–0.44)
ABSOLUTE IMMATURE GRANULOCYTE: 0.05 K/UL (ref 0–0.3)
ABSOLUTE LYMPH #: 2.12 K/UL (ref 1.1–3.7)
ABSOLUTE MONO #: 0.95 K/UL (ref 0.1–1.2)
ALBUMIN SERPL-MCNC: 4.3 G/DL (ref 3.5–5.2)
ALBUMIN/GLOBULIN RATIO: 1.5 (ref 1–2.5)
ALP BLD-CCNC: 93 U/L (ref 35–104)
ALT SERPL-CCNC: 21 U/L (ref 5–33)
ANION GAP SERPL CALCULATED.3IONS-SCNC: 13 MMOL/L (ref 9–17)
AST SERPL-CCNC: 17 U/L
BASOPHILS # BLD: 1 % (ref 0–2)
BASOPHILS ABSOLUTE: 0.06 K/UL (ref 0–0.2)
BILIRUB SERPL-MCNC: 1.7 MG/DL (ref 0.3–1.2)
BUN BLDV-MCNC: 16 MG/DL (ref 8–23)
CALCIUM SERPL-MCNC: 9.4 MG/DL (ref 8.6–10.4)
CHLORIDE BLD-SCNC: 96 MMOL/L (ref 98–107)
CO2: 26 MMOL/L (ref 20–31)
CREAT SERPL-MCNC: 0.81 MG/DL (ref 0.5–0.9)
EOSINOPHILS RELATIVE PERCENT: 1 % (ref 1–4)
ESTIMATED AVERAGE GLUCOSE: 134 MG/DL
GFR SERPL CREATININE-BSD FRML MDRD: >60 ML/MIN/1.73M2
GLUCOSE FASTING: 168 MG/DL (ref 70–99)
HBA1C MFR BLD: 6.3 % (ref 4–6)
HCT VFR BLD CALC: 44.3 % (ref 36.3–47.1)
HEMOGLOBIN: 14.5 G/DL (ref 11.9–15.1)
IMMATURE GRANULOCYTES: 1 %
LYMPHOCYTES # BLD: 20 % (ref 24–43)
MCH RBC QN AUTO: 29.3 PG (ref 25.2–33.5)
MCHC RBC AUTO-ENTMCNC: 32.7 G/DL (ref 28.4–34.8)
MCV RBC AUTO: 89.5 FL (ref 82.6–102.9)
MONOCYTES # BLD: 9 % (ref 3–12)
NRBC AUTOMATED: 0 PER 100 WBC
PDW BLD-RTO: 12.8 % (ref 11.8–14.4)
PLATELET # BLD: 272 K/UL (ref 138–453)
PMV BLD AUTO: 10.7 FL (ref 8.1–13.5)
POTASSIUM SERPL-SCNC: 4 MMOL/L (ref 3.7–5.3)
RBC # BLD: 4.95 M/UL (ref 3.95–5.11)
SEG NEUTROPHILS: 68 % (ref 36–65)
SEGMENTED NEUTROPHILS ABSOLUTE COUNT: 7.26 K/UL (ref 1.5–8.1)
SODIUM BLD-SCNC: 135 MMOL/L (ref 135–144)
T3 FREE: 2.26 PG/ML (ref 2.02–4.43)
THYROXINE, FREE: 1.42 NG/DL (ref 0.93–1.7)
TOTAL PROTEIN: 7.1 G/DL (ref 6.4–8.3)
TSH SERPL DL<=0.05 MIU/L-ACNC: 2.49 UIU/ML (ref 0.3–5)
WBC # BLD: 10.5 K/UL (ref 3.5–11.3)

## 2023-04-19 PROBLEM — I25.119 ATHEROSCLEROTIC HEART DISEASE OF NATIVE CORONARY ARTERY WITH UNSPECIFIED ANGINA PECTORIS (HCC): Status: ACTIVE | Noted: 2023-04-19

## 2023-04-19 PROBLEM — E11.22 TYPE 2 DIABETES MELLITUS WITH CHRONIC KIDNEY DISEASE (HCC): Status: RESOLVED | Noted: 2023-01-26 | Resolved: 2023-04-19

## 2023-06-19 ENCOUNTER — TELEPHONE (OUTPATIENT)
Dept: ORTHOPEDIC SURGERY | Age: 73
End: 2023-06-19

## 2023-06-19 DIAGNOSIS — M75.102 ROTATOR CUFF SYNDROME OF LEFT SHOULDER: Primary | ICD-10-CM

## 2023-06-19 NOTE — TELEPHONE ENCOUNTER
[facility] called - pt was given RX for PT - they need this changed to an OT order - pls and thank -- needs faxed to [phone]

## 2023-07-26 ENCOUNTER — OFFICE VISIT (OUTPATIENT)
Dept: ORTHOPEDIC SURGERY | Age: 73
End: 2023-07-26
Payer: MEDICARE

## 2023-07-26 VITALS — BODY MASS INDEX: 39.08 KG/M2 | HEIGHT: 67 IN | WEIGHT: 249 LBS | OXYGEN SATURATION: 98 % | RESPIRATION RATE: 16 BRPM

## 2023-07-26 DIAGNOSIS — M75.102 ROTATOR CUFF SYNDROME OF LEFT SHOULDER: Primary | ICD-10-CM

## 2023-07-26 PROCEDURE — G8399 PT W/DXA RESULTS DOCUMENT: HCPCS | Performed by: PHYSICIAN ASSISTANT

## 2023-07-26 PROCEDURE — G8417 CALC BMI ABV UP PARAM F/U: HCPCS | Performed by: PHYSICIAN ASSISTANT

## 2023-07-26 PROCEDURE — 3017F COLORECTAL CA SCREEN DOC REV: CPT | Performed by: PHYSICIAN ASSISTANT

## 2023-07-26 PROCEDURE — 99213 OFFICE O/P EST LOW 20 MIN: CPT | Performed by: PHYSICIAN ASSISTANT

## 2023-07-26 PROCEDURE — G8427 DOCREV CUR MEDS BY ELIG CLIN: HCPCS | Performed by: PHYSICIAN ASSISTANT

## 2023-07-26 PROCEDURE — 1090F PRES/ABSN URINE INCON ASSESS: CPT | Performed by: PHYSICIAN ASSISTANT

## 2023-07-26 PROCEDURE — 1123F ACP DISCUSS/DSCN MKR DOCD: CPT | Performed by: PHYSICIAN ASSISTANT

## 2023-07-26 PROCEDURE — 1036F TOBACCO NON-USER: CPT | Performed by: PHYSICIAN ASSISTANT

## 2023-07-26 ASSESSMENT — ENCOUNTER SYMPTOMS
CHEST TIGHTNESS: 0
CONSTIPATION: 0
VOMITING: 0
RESPIRATORY NEGATIVE: 1
DIARRHEA: 0
APNEA: 0
COUGH: 0
ABDOMINAL DISTENTION: 0
SHORTNESS OF BREATH: 0
COLOR CHANGE: 0
NAUSEA: 0
ABDOMINAL PAIN: 0

## 2023-07-26 NOTE — PROGRESS NOTES
office. Orthopedics:     GENERAL: Alert and oriented X3 in no acute distress. SKIN: Intact without lesions or ulcerations. NEURO: Musculoskeletal and axillary nerves intact to sensory and motor testing. VASC: Capillary refill is less than 3 seconds. Left Shoulder Exam     GEN: Alert and oriented X 3, in no acute distress. SKIN: Intact without rashes, lesions, or ulcerations. NEURO: Musculoskeletal ans axillary nerves intact to sensory and motor testing. VASC: Cap refill less than than 3 secs. Negative Adson's test, Negative Saskia's test.  ROM: 140 degrees of active forward elevation, 160 degrees of passive forward elevation, 50 degrees of external rotation in neutral, 80 degrees of external rotation in abduction, internal rotation to back L1.    STRENGTH: Supraspinatus 4/5, external rotators 4+/5. MUSC: No atrophy, negative subscap lift off or belly press test.  IMP: mild Neer's sign, negative Hawkin's sign, + Coracoid impingement, + painful arc, + pain with cross body abduction. PALP: + pain over anterolateral acromion, + pain over AC joint, no pain over traps/rhomboids. INST: negative Volusia's test, mild Speed's test    Radiology: Previous x-rays reviewed    Procedure: none    Assessment:      1. Rotator cuff syndrome of left shoulder       Plan:   I reviewed the patient's x-rays. We discussed the various treatment alternatives including anti-inflammatory medications, physical therapy, injections, further imaging studies and a last resort surgery. We discussed that the patient is only approximately 50% better. She states she is able to sleep better but still has shoulder pain and is thinking about how she moves her shoulder at all times. I do think it would be reasonable to get an MRI at this time for 2 reasons, 1 for possible surgical planning or even to help us to determine the best injection site. The patient is in agreement with this plan.   She will hold physical therapy at this time but

## 2023-08-09 ENCOUNTER — HOSPITAL ENCOUNTER (OUTPATIENT)
Dept: MRI IMAGING | Facility: CLINIC | Age: 73
Discharge: HOME | End: 2023-08-11
Payer: MEDICARE

## 2023-08-09 DIAGNOSIS — M75.102 ROTATOR CUFF SYNDROME OF LEFT SHOULDER: ICD-10-CM

## 2023-08-09 PROCEDURE — 73221 MRI JOINT UPR EXTREM W/O DYE: CPT

## 2023-08-10 NOTE — RESULT ENCOUNTER NOTE
Patient has a complete tear of her rotator cuff.   Patient will need a follow-up appointment for an MRI review

## 2023-08-11 ENCOUNTER — TELEPHONE (OUTPATIENT)
Dept: ORTHOPEDIC SURGERY | Age: 73
End: 2023-08-11

## 2023-08-11 NOTE — TELEPHONE ENCOUNTER
----- Message from Rayne Ruiz PA-C sent at 8/10/2023  5:23 PM EDT -----  Patient has a complete tear of her rotator cuff.   Patient will need a follow-up appointment for an MRI review

## 2023-08-11 NOTE — TELEPHONE ENCOUNTER
Writer attempted to contact the patient to inform her of the information below. If the patient calls back please refer to the message below and schedule the patient for a MRI review with [facility]. Thank you.

## 2023-08-16 ENCOUNTER — ANESTHESIA (OUTPATIENT)
Dept: OPERATING ROOM | Age: 73
End: 2023-08-16
Payer: MEDICARE

## 2023-08-16 ENCOUNTER — ANESTHESIA EVENT (OUTPATIENT)
Dept: OPERATING ROOM | Age: 73
End: 2023-08-16
Payer: MEDICARE

## 2023-08-16 ENCOUNTER — HOSPITAL ENCOUNTER (OUTPATIENT)
Age: 73
Setting detail: OUTPATIENT SURGERY
Discharge: HOME | End: 2023-08-16
Attending: INTERNAL MEDICINE | Admitting: INTERNAL MEDICINE
Payer: MEDICARE

## 2023-08-16 VITALS
OXYGEN SATURATION: 96 % | BODY MASS INDEX: 37.28 KG/M2 | TEMPERATURE: 97.3 F | RESPIRATION RATE: 17 BRPM | HEIGHT: 68 IN | WEIGHT: 246 LBS | HEART RATE: 64 BPM | SYSTOLIC BLOOD PRESSURE: 130 MMHG | DIASTOLIC BLOOD PRESSURE: 59 MMHG

## 2023-08-16 LAB — GLUCOSE BLD-MCNC: 171 MG/DL (ref 65–105)

## 2023-08-16 PROCEDURE — 3700000000 HC ANESTHESIA ATTENDED CARE: Performed by: INTERNAL MEDICINE

## 2023-08-16 PROCEDURE — 6360000002 HC RX W HCPCS: Performed by: NURSE ANESTHETIST, CERTIFIED REGISTERED

## 2023-08-16 PROCEDURE — 2709999900 HC NON-CHARGEABLE SUPPLY: Performed by: INTERNAL MEDICINE

## 2023-08-16 PROCEDURE — 3700000001 HC ADD 15 MINUTES (ANESTHESIA): Performed by: INTERNAL MEDICINE

## 2023-08-16 PROCEDURE — 7100000010 HC PHASE II RECOVERY - FIRST 15 MIN: Performed by: INTERNAL MEDICINE

## 2023-08-16 PROCEDURE — 2580000003 HC RX 258: Performed by: ANESTHESIOLOGY

## 2023-08-16 PROCEDURE — 2500000003 HC RX 250 WO HCPCS: Performed by: NURSE ANESTHETIST, CERTIFIED REGISTERED

## 2023-08-16 PROCEDURE — 7100000011 HC PHASE II RECOVERY - ADDTL 15 MIN: Performed by: INTERNAL MEDICINE

## 2023-08-16 PROCEDURE — 3609027000 HC COLONOSCOPY: Performed by: INTERNAL MEDICINE

## 2023-08-16 PROCEDURE — 82947 ASSAY GLUCOSE BLOOD QUANT: CPT

## 2023-08-16 RX ORDER — SODIUM CHLORIDE 9 MG/ML
INJECTION, SOLUTION INTRAVENOUS CONTINUOUS
Status: DISCONTINUED | OUTPATIENT
Start: 2023-08-16 | End: 2023-08-16 | Stop reason: HOSPADM

## 2023-08-16 RX ORDER — SODIUM CHLORIDE 0.9 % (FLUSH) 0.9 %
5-40 SYRINGE (ML) INJECTION EVERY 12 HOURS SCHEDULED
Status: DISCONTINUED | OUTPATIENT
Start: 2023-08-16 | End: 2023-08-16 | Stop reason: HOSPADM

## 2023-08-16 RX ORDER — SODIUM CHLORIDE 0.9 % (FLUSH) 0.9 %
5-40 SYRINGE (ML) INJECTION PRN
Status: DISCONTINUED | OUTPATIENT
Start: 2023-08-16 | End: 2023-08-16 | Stop reason: HOSPADM

## 2023-08-16 RX ORDER — LIDOCAINE HYDROCHLORIDE 20 MG/ML
INJECTION, SOLUTION INFILTRATION; PERINEURAL PRN
Status: DISCONTINUED | OUTPATIENT
Start: 2023-08-16 | End: 2023-08-16 | Stop reason: SDUPTHER

## 2023-08-16 RX ORDER — HYDROMORPHONE HYDROCHLORIDE 1 MG/ML
0.5 INJECTION, SOLUTION INTRAMUSCULAR; INTRAVENOUS; SUBCUTANEOUS EVERY 5 MIN PRN
Status: DISCONTINUED | OUTPATIENT
Start: 2023-08-16 | End: 2023-08-16 | Stop reason: HOSPADM

## 2023-08-16 RX ORDER — ONDANSETRON 2 MG/ML
4 INJECTION INTRAMUSCULAR; INTRAVENOUS
Status: DISCONTINUED | OUTPATIENT
Start: 2023-08-16 | End: 2023-08-16 | Stop reason: HOSPADM

## 2023-08-16 RX ORDER — PROPOFOL 10 MG/ML
INJECTION, EMULSION INTRAVENOUS PRN
Status: DISCONTINUED | OUTPATIENT
Start: 2023-08-16 | End: 2023-08-16 | Stop reason: SDUPTHER

## 2023-08-16 RX ORDER — SODIUM CHLORIDE 9 MG/ML
INJECTION, SOLUTION INTRAVENOUS PRN
Status: DISCONTINUED | OUTPATIENT
Start: 2023-08-16 | End: 2023-08-16 | Stop reason: HOSPADM

## 2023-08-16 RX ORDER — SODIUM CHLORIDE, SODIUM LACTATE, POTASSIUM CHLORIDE, CALCIUM CHLORIDE 600; 310; 30; 20 MG/100ML; MG/100ML; MG/100ML; MG/100ML
INJECTION, SOLUTION INTRAVENOUS CONTINUOUS
Status: DISCONTINUED | OUTPATIENT
Start: 2023-08-16 | End: 2023-08-16 | Stop reason: HOSPADM

## 2023-08-16 RX ORDER — LIDOCAINE HYDROCHLORIDE 10 MG/ML
1 INJECTION, SOLUTION EPIDURAL; INFILTRATION; INTRACAUDAL; PERINEURAL
Status: DISCONTINUED | OUTPATIENT
Start: 2023-08-17 | End: 2023-08-16 | Stop reason: HOSPADM

## 2023-08-16 RX ORDER — FENTANYL CITRATE 50 UG/ML
25 INJECTION, SOLUTION INTRAMUSCULAR; INTRAVENOUS EVERY 5 MIN PRN
Status: DISCONTINUED | OUTPATIENT
Start: 2023-08-16 | End: 2023-08-16 | Stop reason: HOSPADM

## 2023-08-16 RX ADMIN — PROPOFOL 50 MG: 10 INJECTION, EMULSION INTRAVENOUS at 11:42

## 2023-08-16 RX ADMIN — LIDOCAINE HYDROCHLORIDE 75 MG: 20 INJECTION, SOLUTION INFILTRATION; PERINEURAL at 11:23

## 2023-08-16 RX ADMIN — PROPOFOL 50 MG: 10 INJECTION, EMULSION INTRAVENOUS at 11:37

## 2023-08-16 RX ADMIN — PROPOFOL 25 MG: 10 INJECTION, EMULSION INTRAVENOUS at 11:25

## 2023-08-16 RX ADMIN — PROPOFOL 50 MG: 10 INJECTION, EMULSION INTRAVENOUS at 11:33

## 2023-08-16 RX ADMIN — SODIUM CHLORIDE, POTASSIUM CHLORIDE, SODIUM LACTATE AND CALCIUM CHLORIDE: 600; 310; 30; 20 INJECTION, SOLUTION INTRAVENOUS at 10:53

## 2023-08-16 RX ADMIN — PROPOFOL 75 MG: 10 INJECTION, EMULSION INTRAVENOUS at 11:23

## 2023-08-16 RX ADMIN — PROPOFOL 50 MG: 10 INJECTION, EMULSION INTRAVENOUS at 11:29

## 2023-08-16 ASSESSMENT — PAIN - FUNCTIONAL ASSESSMENT: PAIN_FUNCTIONAL_ASSESSMENT: 0-10

## 2023-08-16 NOTE — DISCHARGE INSTRUCTIONS
[facility]    POST-ENDOSCOPY INSTRUCTIONS    1. ACTIVITY   No driving, operating machinery, or making important decisions for 24 hours. Resume normal activity after 24 hours. You may return to work after 24 hours. 2. DIET        Resume your usual diet unless specified below. 3. MEDICATIONS    Resume your usual medications. Do not consume alcohol, tranquilizers, or sleeping medications for 24 hour unless advised by your physician. 4. PHYSICIAN FOLLOW-UP / INSTRUCTIONS    Next colonoscopy in 5 years. GI office:  32 07 00          Follow up with your family physician as planned. 6. NORMAL CHANGES YOU MAY EXPERIENCE AFTER ENDOSCOPY:         EGD/ERCP     COLONOSCOPY     Sore throat after EGD/ERCP  Passing of gas for several hours. A bloated feeling and belching from  Some mild abdominal cramping. air in stomach               You may feel fatigued for the next 24-48    hours due to the prep and sedation    7. CALL YOUR PHYSICIAN IF YOU EXPERIENCE ANY OF THE FOLLOWING      A. Passing blood rectally or vomiting blood (color may be red or black)      B. Severe abdominal pain or tenderness (that is not relieved by passing air)      C.   Fever, chills, or excessive sweating      D.  Persistent nausea or vomiting      E.  Redness or swelling at the IV site

## 2023-08-16 NOTE — OP NOTE
COLONOSCOPY NOTE      Patient:   Hossein Maloney    :    1950    Referring/PCP: Estevan Tello DO  Facility:  [facility]  Procedure:   Colonoscopy --diagnostic  Date:     2023  Endoscopist:  Valente Favre, DO  Assistant:                    None    Preoperative Diagnosis:    History of colon polyps    Postoperative Diagnosis:    Hemorrhoids  Tortuous colon    Anesthesia: MAC    Complications:  None    Description of Procedure:  Informed consent was obtained after explanation of the procedure including indications, description of the procedure,  benefits and possible risks and complications of the procedure, and alternatives. Questions were answered. The patient's history was reviewed and a directed physical examination was performed prior to the procedure. Patient was monitored throughout the procedure with pulse oximetry and periodic assessment of vital signs. Patient was sedated as noted above. With the patient initially in the left lateral decubitus position, a digital rectal examination was performed. The Olympus video colonoscope was placed in the patient's rectum and advanced without difficulty  to the cecum, which was identified by the ileocecal valve and appendiceal orifice. The prep was good. Examination of the mucosa was performed during both introduction and withdrawal of the colonoscope. Retroflexed view of the rectum was performed. The patient  was taken to the recovery area in good condition. EBL: none  Specimen: none  Implants: none  Prep: Golytely  W/D time: 7 min  Last colonoscopy: 2019     Findings:     Tortuous sigmoid colon. Redundant right colon. Small internal hemorrhoids. Recommendations:   Repeat colonoscopy in 5 years.           Electronically signed by Isabell Godfrey MD, D.O on 2023 at 11:06 AM

## 2023-08-16 NOTE — ANESTHESIA PRE PROCEDURE
Department of Anesthesiology  Preprocedure Note       Name:  Oswaldo Hines   Age:  72 y.o.  :  1950                                          MRN:  [id number]         Date:  2023      Surgeon: Jorge Mitchell):  Genoveva Hammonds MD    Procedure: Procedure(s):  COLORECTAL CANCER SCREENING, NOT HIGH RISK    Medications prior to admission:   Prior to Admission medications    Medication Sig Start Date End Date Taking? Authorizing Provider   clopidogrel (PLAVIX) 75 MG tablet Take 1 tablet by mouth daily 23   Naye Dash MD   clobetasol (TEMOVATE) 0.05 % cream Apply topically bid prn to lichen sclerosis    Yesy Sorenson, DO   pantoprazole (PROTONIX) 40 MG tablet Take 1 tablet by mouth 2 times daily 23   Yesy Sorenson, DO   escitalopram (LEXAPRO) 5 MG tablet Take 1 tablet by mouth daily 23   Yesy Sorenson, DO   levothyroxine (SYNTHROID) 175 MCG tablet Take 1 tablet by mouth Daily Except skip on Saturday and take 1/2 tablet on Wednesday. 23   Yesy Sorenson, DO   metFORMIN (GLUCOPHAGE-XR) 500 MG extended release tablet Take 1 tablet by mouth 2 times daily (before meals) 23   Yesy Sorenson, DO   hydroCHLOROthiazide (HYDRODIURIL) 12.5 MG tablet Take 1 tablet by mouth every morning 23   Yesy Sorenson, DO   nitroGLYCERIN (NITROSTAT) 0.4 MG SL tablet Place 1 tablet under the tongue every 5 minutes as needed for Chest pain Dissolve 1 tablet under tongue for chest pain and repeat every 5 min up to max of 3 total doses.   If no relief after 3 doses call 911 23   Yesy Sorenson, DO   losartan (COZAAR) 100 MG tablet Take 1 tablet by mouth daily 23   Yesy Sorenson, DO   rosuvastatin (CRESTOR) 5 MG tablet Every other day 10/16/17   Historical Provider, MD   aspirin EC 81 MG EC tablet Take 1 tablet by mouth daily 16   Yesy Sorenson, DO   vitamin D (ERGOCALCIFEROL) 04146 UNITS CAPS capsule Take 1 capsule by mouth once a week 16   Keegan Cipro

## 2023-08-16 NOTE — ANESTHESIA POSTPROCEDURE EVALUATION
Department of Anesthesiology  Postprocedure Note    Patient: Harsha Her  MRN: [id number]  YOB: 1950  Date of evaluation: 8/16/2023      Procedure Summary     Date: 08/16/23 Room / Location: [place] [id number] / [facility] - INPATIENT    Anesthesia Start: 1118 Anesthesia Stop: 4888    Procedure: COLORECTAL CANCER SCREENING, NOT HIGH RISK (Rectum) Diagnosis:       Hx of colonic polyps      Family hx of colon cancer      (Hx of colonic polyps [Z86.010])      (Family hx of colon cancer [Z80.0])    Surgeons: Keegan Beach MD Responsible Provider: Maurisio Martin MD    Anesthesia Type: MAC ASA Status: 3          Anesthesia Type: No value filed.     Clay Phase I:      Clay Phase II:        Anesthesia Post Evaluation    Patient location during evaluation: PACU  Patient participation: complete - patient participated  Level of consciousness: awake and alert  Airway patency: patent  Nausea & Vomiting: no nausea and no vomiting  Complications: no  Cardiovascular status: hemodynamically stable  Respiratory status: acceptable  Hydration status: euvolemic  Pain management: adequate

## 2023-08-24 ENCOUNTER — OFFICE VISIT (OUTPATIENT)
Dept: ORTHOPEDIC SURGERY | Age: 73
End: 2023-08-24

## 2023-08-24 VITALS — HEIGHT: 68 IN | WEIGHT: 250.4 LBS | BODY MASS INDEX: 37.95 KG/M2 | RESPIRATION RATE: 14 BRPM

## 2023-08-24 DIAGNOSIS — M75.22 BICEPS TENDINITIS ON LEFT: ICD-10-CM

## 2023-08-24 DIAGNOSIS — M75.122 NONTRAUMATIC COMPLETE TEAR OF LEFT ROTATOR CUFF: Primary | ICD-10-CM

## 2023-08-24 RX ORDER — BETAMETHASONE SODIUM PHOSPHATE AND BETAMETHASONE ACETATE 3; 3 MG/ML; MG/ML
6 INJECTION, SUSPENSION INTRA-ARTICULAR; INTRALESIONAL; INTRAMUSCULAR; SOFT TISSUE ONCE
Status: COMPLETED | OUTPATIENT
Start: 2023-08-24 | End: 2023-08-24

## 2023-08-24 RX ORDER — BUPIVACAINE HYDROCHLORIDE 2.5 MG/ML
2 INJECTION, SOLUTION INFILTRATION; PERINEURAL ONCE
Status: COMPLETED | OUTPATIENT
Start: 2023-08-24 | End: 2023-08-24

## 2023-08-24 RX ADMIN — BUPIVACAINE HYDROCHLORIDE 5 MG: 2.5 INJECTION, SOLUTION INFILTRATION; PERINEURAL at 15:15

## 2023-08-24 RX ADMIN — BETAMETHASONE SODIUM PHOSPHATE AND BETAMETHASONE ACETATE 6 MG: 3; 3 INJECTION, SUSPENSION INTRA-ARTICULAR; INTRALESIONAL; INTRAMUSCULAR; SOFT TISSUE at 15:14

## 2023-08-24 ASSESSMENT — ENCOUNTER SYMPTOMS
ABDOMINAL PAIN: 0
APNEA: 0
CONSTIPATION: 0
NAUSEA: 0
DIARRHEA: 0
ABDOMINAL DISTENTION: 0
VOMITING: 0
CHEST TIGHTNESS: 0
RESPIRATORY NEGATIVE: 1
SHORTNESS OF BREATH: 0
COLOR CHANGE: 0
COUGH: 0

## 2023-10-11 ENCOUNTER — OFFICE VISIT (OUTPATIENT)
Dept: ORTHOPEDIC SURGERY | Age: 73
End: 2023-10-11
Payer: MEDICARE

## 2023-10-11 VITALS — BODY MASS INDEX: 38.34 KG/M2 | WEIGHT: 253 LBS | HEIGHT: 68 IN | RESPIRATION RATE: 13 BRPM

## 2023-10-11 DIAGNOSIS — M75.122 NONTRAUMATIC COMPLETE TEAR OF LEFT ROTATOR CUFF: Primary | ICD-10-CM

## 2023-10-11 PROCEDURE — G8399 PT W/DXA RESULTS DOCUMENT: HCPCS | Performed by: ORTHOPAEDIC SURGERY

## 2023-10-11 PROCEDURE — G8484 FLU IMMUNIZE NO ADMIN: HCPCS | Performed by: ORTHOPAEDIC SURGERY

## 2023-10-11 PROCEDURE — 3017F COLORECTAL CA SCREEN DOC REV: CPT | Performed by: ORTHOPAEDIC SURGERY

## 2023-10-11 PROCEDURE — G8417 CALC BMI ABV UP PARAM F/U: HCPCS | Performed by: ORTHOPAEDIC SURGERY

## 2023-10-11 PROCEDURE — 1090F PRES/ABSN URINE INCON ASSESS: CPT | Performed by: ORTHOPAEDIC SURGERY

## 2023-10-11 PROCEDURE — 1123F ACP DISCUSS/DSCN MKR DOCD: CPT | Performed by: ORTHOPAEDIC SURGERY

## 2023-10-11 PROCEDURE — 99213 OFFICE O/P EST LOW 20 MIN: CPT | Performed by: ORTHOPAEDIC SURGERY

## 2023-10-11 PROCEDURE — 1036F TOBACCO NON-USER: CPT | Performed by: ORTHOPAEDIC SURGERY

## 2023-10-11 PROCEDURE — G8427 DOCREV CUR MEDS BY ELIG CLIN: HCPCS | Performed by: ORTHOPAEDIC SURGERY

## 2023-10-11 NOTE — PROGRESS NOTES
this encounter. No orders of the defined types were placed in this encounter. Electronically signed by Chaya Mendoza DO Orthopedic Surgery Resident on 10/11/2023 at 1:39 PM    This note is created with the assistance of a speech recognition program.  While intending to generate a document that actually reflects the content of the visit, the document can still have some errors including those of syntax and sound a like substitutions which may escape proof reading.   In such instances, actual meaning can be extrapolated by contextual diversion

## 2023-11-01 ENCOUNTER — HOSPITAL ENCOUNTER (OUTPATIENT)
Age: 73
Setting detail: SPECIMEN
Discharge: HOME | End: 2023-11-01

## 2023-11-01 DIAGNOSIS — E89.0 POSTABLATIVE HYPOTHYROIDISM: ICD-10-CM

## 2023-11-01 DIAGNOSIS — I10 PRIMARY HYPERTENSION: ICD-10-CM

## 2023-11-01 DIAGNOSIS — I25.119 ATHEROSCLEROSIS OF NATIVE CORONARY ARTERY OF NATIVE HEART WITH ANGINA PECTORIS (HCC): ICD-10-CM

## 2023-11-01 DIAGNOSIS — E11.9 TYPE 2 DIABETES MELLITUS WITHOUT COMPLICATION, WITHOUT LONG-TERM CURRENT USE OF INSULIN (HCC): ICD-10-CM

## 2023-11-01 LAB
ALBUMIN SERPL-MCNC: 4.2 G/DL (ref 3.5–5.2)
ALBUMIN/GLOB SERPL: 2 {RATIO} (ref 1–2.5)
ALP SERPL-CCNC: 71 U/L (ref 35–104)
ALT SERPL-CCNC: 20 U/L (ref 10–35)
ANION GAP SERPL CALCULATED.3IONS-SCNC: 12 MMOL/L (ref 9–16)
AST SERPL-CCNC: 21 U/L (ref 10–35)
BILIRUB SERPL-MCNC: 0.8 MG/DL (ref 0–1.2)
BUN SERPL-MCNC: 15 MG/DL (ref 8–23)
CALCIUM SERPL-MCNC: 9.3 MG/DL (ref 8.6–10.4)
CHLORIDE SERPL-SCNC: 104 MMOL/L (ref 98–107)
CHOLEST SERPL-MCNC: 116 MG/DL (ref 0–199)
CHOLESTEROL/HDL RATIO: 3
CO2 SERPL-SCNC: 27 MMOL/L (ref 20–31)
CREAT SERPL-MCNC: 0.8 MG/DL (ref 0.5–0.9)
EST. AVERAGE GLUCOSE BLD GHB EST-MCNC: 146 MG/DL
GFR SERPL CREATININE-BSD FRML MDRD: >60 ML/MIN/1.73M2
GLUCOSE P FAST SERPL-MCNC: 134 MG/DL (ref 74–99)
HBA1C MFR BLD: 6.7 % (ref 4–6)
HDLC SERPL-MCNC: 34 MG/DL
LDLC SERPL CALC-MCNC: 40 MG/DL (ref 0–100)
POTASSIUM SERPL-SCNC: 3.9 MMOL/L (ref 3.7–5.3)
PROT SERPL-MCNC: 6.4 G/DL (ref 6.6–8.7)
SODIUM SERPL-SCNC: 143 MMOL/L (ref 136–145)
T4 FREE SERPL-MCNC: 1.5 NG/DL (ref 0.93–1.7)
TRIGL SERPL-MCNC: 208 MG/DL (ref 0–149)
TSH SERPL DL<=0.05 MIU/L-ACNC: 2.97 UIU/ML (ref 0.27–4.2)
VLDLC SERPL CALC-MCNC: 42 MG/DL

## 2024-04-19 ENCOUNTER — HOSPITAL ENCOUNTER (OUTPATIENT)
Age: 74
Setting detail: SPECIMEN
Discharge: HOME | End: 2024-04-19

## 2024-04-19 DIAGNOSIS — R00.2 HEART PALPITATIONS: ICD-10-CM

## 2024-04-19 PROBLEM — E11.22 TYPE 2 DIABETES MELLITUS WITH CHRONIC KIDNEY DISEASE (HCC): Status: ACTIVE | Noted: 2024-04-19

## 2024-04-19 PROBLEM — E11.22 TYPE 2 DIABETES MELLITUS WITH CHRONIC KIDNEY DISEASE (HCC): Status: RESOLVED | Noted: 2024-04-19 | Resolved: 2024-04-19

## 2024-04-19 LAB
ALBUMIN SERPL-MCNC: 4.5 G/DL (ref 3.5–5.2)
ALBUMIN/GLOB SERPL: 2 {RATIO} (ref 1–2.5)
ALP SERPL-CCNC: 86 U/L (ref 35–104)
ALT SERPL-CCNC: 23 U/L (ref 10–35)
ANION GAP SERPL CALCULATED.3IONS-SCNC: 14 MMOL/L (ref 9–16)
AST SERPL-CCNC: 24 U/L (ref 10–35)
BASOPHILS # BLD: 0.05 K/UL (ref 0–0.2)
BASOPHILS NFR BLD: 1 % (ref 0–2)
BILIRUB SERPL-MCNC: 1 MG/DL (ref 0–1.2)
BUN SERPL-MCNC: 11 MG/DL (ref 8–23)
CALCIUM SERPL-MCNC: 9.8 MG/DL (ref 8.6–10.4)
CHLORIDE SERPL-SCNC: 101 MMOL/L (ref 98–107)
CO2 SERPL-SCNC: 26 MMOL/L (ref 20–31)
CREAT SERPL-MCNC: 0.8 MG/DL (ref 0.5–0.9)
EOSINOPHIL # BLD: 0.11 K/UL (ref 0–0.44)
EOSINOPHILS RELATIVE PERCENT: 1 % (ref 1–4)
ERYTHROCYTE [DISTWIDTH] IN BLOOD BY AUTOMATED COUNT: 13.3 % (ref 11.8–14.4)
GFR SERPL CREATININE-BSD FRML MDRD: 78 ML/MIN/1.73M2
GLUCOSE P FAST SERPL-MCNC: 142 MG/DL (ref 74–99)
HCT VFR BLD AUTO: 42.5 % (ref 36.3–47.1)
HGB BLD-MCNC: 13.7 G/DL (ref 11.9–15.1)
IMM GRANULOCYTES # BLD AUTO: 0.05 K/UL (ref 0–0.3)
IMM GRANULOCYTES NFR BLD: 1 %
LYMPHOCYTES NFR BLD: 2.73 K/UL (ref 1.1–3.7)
LYMPHOCYTES RELATIVE PERCENT: 32 % (ref 24–43)
MCH RBC QN AUTO: 28.8 PG (ref 25.2–33.5)
MCHC RBC AUTO-ENTMCNC: 32.2 G/DL (ref 28.4–34.8)
MCV RBC AUTO: 89.5 FL (ref 82.6–102.9)
MONOCYTES NFR BLD: 0.74 K/UL (ref 0.1–1.2)
MONOCYTES NFR BLD: 9 % (ref 3–12)
NEUTROPHILS NFR BLD: 56 % (ref 36–65)
NEUTS SEG NFR BLD: 4.99 K/UL (ref 1.5–8.1)
NRBC BLD-RTO: 0 PER 100 WBC
PLATELET # BLD AUTO: 283 K/UL (ref 138–453)
PMV BLD AUTO: 10.7 FL (ref 8.1–13.5)
POTASSIUM SERPL-SCNC: 3.7 MMOL/L (ref 3.7–5.3)
PROT SERPL-MCNC: 7 G/DL (ref 6.6–8.7)
RBC # BLD AUTO: 4.75 M/UL (ref 3.95–5.11)
SODIUM SERPL-SCNC: 141 MMOL/L (ref 136–145)
T4 FREE SERPL-MCNC: 1.7 NG/DL (ref 0.92–1.68)
TSH SERPL DL<=0.05 MIU/L-ACNC: 1.2 UIU/ML (ref 0.27–4.2)
WBC OTHER # BLD: 8.7 K/UL (ref 3.5–11.3)

## 2024-06-07 ENCOUNTER — HOSPITAL ENCOUNTER (OUTPATIENT)
Age: 74
Setting detail: SPECIMEN
Discharge: HOME | End: 2024-06-07

## 2024-06-07 DIAGNOSIS — E89.0 POSTABLATIVE HYPOTHYROIDISM: ICD-10-CM

## 2024-06-07 LAB
T4 FREE SERPL-MCNC: 1.4 NG/DL (ref 0.92–1.68)
TSH SERPL DL<=0.05 MIU/L-ACNC: 4.36 UIU/ML (ref 0.27–4.2)

## 2025-07-18 NOTE — CARE COORDINATION
Challenges to be reviewed by the provider: None  Additional needs identified to be addressed with provider No  none  Discussed COVID-19 related testing which was not done at this time. Test results were not done. Patient informed of results, if available? N/A         Method of communication with provider : none    Do you have: none  Puncture site status: no swelling, redness  Pain status: o/10  Activity status: increased    [place] [facility] Transitions Follow Up Call    2020    Patient: Capri Vasquez  Patient : 1950   MRN: [id number]  Reason for Admission: angioplasty with stent  Discharge Date: 16 RARS: No data recorded       Spoke with:Pat    LILA spoke to [place] who stated she went to NP visit for follow up yesterday and was told everything looks \"fabulous\". Stated she lost another 4 lbs for a total of 23 lbs so far through intermittent fasting. Stated goal is another 90 lbs. Stated she is feeling much better, will incorporate more exercise into regimen with walking. Stated she has 4 stents now, has @ 30 blockage in one other artery. No CP/pressure, palpitations, SOB. Will continue to follow up for BPCI. Care Transitions Subsequent and Final Call    Subsequent and Final Calls  Care Transitions Interventions  Other Interventions:             Follow Up  Future Appointments   Date Time Provider Stephie So   10/20/2020  2:30 PM DO BRANDON Nielson [id number] None       Ady Trammell RN [] : Fellow [[email or web address]] :  Tisha Brownlee MD, PGY 5 Teena Best MD Pediatric Nephrology  [Time Spent: ___ minutes] : I have spent [unfilled] minutes of time on the encounter which excludes teaching and separately reported services. LACERATION

## 2025-07-24 PROBLEM — E66.01 MORBID (SEVERE) OBESITY DUE TO EXCESS CALORIES (HCC): Status: ACTIVE | Noted: 2025-07-24

## 2025-07-31 ENCOUNTER — HOSPITAL ENCOUNTER (OUTPATIENT)
Age: 75
Setting detail: SPECIMEN
Discharge: HOME | End: 2025-07-31

## 2025-07-31 DIAGNOSIS — E78.1 HYPERTRIGLYCERIDEMIA: ICD-10-CM

## 2025-07-31 DIAGNOSIS — E78.2 MIXED HYPERLIPIDEMIA: ICD-10-CM

## 2025-07-31 LAB
CHOLEST SERPL-MCNC: 110 MG/DL (ref 0–199)
CHOLESTEROL/HDL RATIO: 3.7
HDLC SERPL-MCNC: 30 MG/DL
LDLC SERPL CALC-MCNC: 15 MG/DL (ref 0–100)
TRIGL SERPL-MCNC: 326 MG/DL
VLDLC SERPL CALC-MCNC: 65 MG/DL (ref 1–30)

## (undated) DEVICE — CO2 CANNULA,SUPERSOFT, ADLT,7'O2,7'CO2: Brand: MEDLINE

## (undated) DEVICE — GOWN POLY REINF SONT XLG: Brand: MEDLINE INDUSTRIES, INC.

## (undated) DEVICE — GAUZE,SPONGE,4"X4",16PLY,STRL,LF,10/TRAY: Brand: MEDLINE

## (undated) DEVICE — MEDICINE CUP, GRADUATED, STER: Brand: MEDLINE

## (undated) DEVICE — SYRINGE MED 50ML LUERLOCK TIP

## (undated) DEVICE — BASIN EMSIS 700ML GRAPHITE PLAS KID SHP GRAD

## (undated) DEVICE — ADAPTER TBNG LUER STUB 15 GA INTMED

## (undated) DEVICE — JELLY,LUBE,STERILE,FLIP TOP,TUBE,2-OZ: Brand: MEDLINE